# Patient Record
Sex: MALE | Race: WHITE
[De-identification: names, ages, dates, MRNs, and addresses within clinical notes are randomized per-mention and may not be internally consistent; named-entity substitution may affect disease eponyms.]

---

## 2021-04-11 ENCOUNTER — HOSPITAL ENCOUNTER (EMERGENCY)
Dept: HOSPITAL 7 - FB.ED | Age: 21
Discharge: HOME | End: 2021-04-11
Payer: MEDICAID

## 2021-04-11 DIAGNOSIS — Z20.822: ICD-10-CM

## 2021-04-11 DIAGNOSIS — B34.9: ICD-10-CM

## 2021-04-11 DIAGNOSIS — J06.9: Primary | ICD-10-CM

## 2021-04-11 DIAGNOSIS — Z72.0: ICD-10-CM

## 2021-04-11 PROCEDURE — U0002 COVID-19 LAB TEST NON-CDC: HCPCS

## 2021-04-11 PROCEDURE — 87635 SARS-COV-2 COVID-19 AMP PRB: CPT

## 2021-04-11 PROCEDURE — 99282 EMERGENCY DEPT VISIT SF MDM: CPT

## 2021-04-11 PROCEDURE — 99284 EMERGENCY DEPT VISIT MOD MDM: CPT

## 2021-04-11 NOTE — EDM.PDOC
ED HPI GENERAL MEDICAL PROBLEM





- General


Stated Complaint: POSSIBLE COVID


Time Seen by Provider: 04/11/21 13:00


Source of Information: Reports: Patient


History Limitations: Reports: No Limitations





- History of Present Illness


INITIAL COMMENTS - FREE TEXT/NARRATIVE: 





20-year-old male who reports on Thursday night, 4/8/2021, that he began to have 

nasal congestion, malaise, fatigue and body aches. He has since lost his sense 

of taste and smell. He also has developed some nausea with stomach cramping and 

decreased appetite. He also had diarrhea yesterday but that has pretty much 

resolved today. He has been drinking liquids well but has not really been 

eating. He has minimal cough and shortness of breath at present but he does feel

short of breath at times. The nasal congestion has waxed and waned over the past

3 days. He also states that he feels generally weak and tired. He reports that 

he has a headache and body aches and these are aching and throbbing type pains 

that he rates as a 5/10. There are no other associated signs or symptoms. There 

are no other modifying factors.


Onset: Other ([id number])


Duration: Constant


Location: Reports: Head, Neck, Generalized


Quality: Reports: Ache, Throbbing


Severity: Moderate


Improves with: Reports: Rest


Worsens with: Reports: Movement


Context: Reports: Other


Associated Symptoms: Reports: Cough, Headaches, Loss of Appetite, Nause

a/Vomiting (And diarrhea), Weakness


Treatments PTA: Reports: Other (see below) (Nothing.)





- Related Data


                                    Allergies











Allergy/AdvReac Type Severity Reaction Status Date / Time


 


No Known Allergies Allergy   Verified 04/11/21 13:08











Home Meds: 


                                    Home Meds





NK [No Known Home Meds]  04/11/21 [History]











Past Medical History





- Past Health History


Medical/Surgical History: Denies Medical/Surgical History





- Past Surgical History


Other Surgical History Comment: No previous surgeries.





Social & Family History





- Tobacco Use


Tobacco Use Status *Q: Current Every Day Tobacco User





- Alcohol Use


Alcohol Use History: No





- Living Situation & Occupation


Occupation: Employed (Works at [employer].)





ED ROS GENERAL





- Review of Systems


Review Of Systems: See Below


Constitutional: Reports: Malaise, Weakness, Fatigue, Decreased Appetite


HEENT: Reports: Other (Nasal congestion. Loss of taste and smell.)


Respiratory: Reports: Shortness of Breath (Intermittently), Cough (Mild and 

nonproductive)


Cardiovascular: Reports: No Symptoms


Endocrine: Reports: No Symptoms


GI/Abdominal: Reports: Diarrhea, Nausea, Other (Some stomach cramping)


: Reports: No Symptoms


Musculoskeletal: Reports: Other (Generalized body aches.)


Skin: Reports: No Symptoms


Neurological: Reports: Headache


Psychiatric: Reports: No Symptoms


Hematologic/Lymphatic: Reports: No Symptoms


Immunologic: Reports: No Symptoms





ED EXAM, GENERAL





- Physical Exam


Exam: See Below


Exam Limited By: No Limitations


General Appearance: Alert, WD/WN, No Apparent Distress, Other (Nontoxic. Pulse 

rate on my exam was in the 90s.)


Eye Exam: Bilateral Eye: EOMI, Normal Inspection, PERRL


Ears: Normal External Exam, Hearing Grossly Normal


Ear Exam: Bilateral Ear: Auricle Normal


Nose: No Blood, Clear Rhinorrhea, Other (Medical still edema.)


Throat/Mouth: Normal Inspection, Normal Lips, Normal Oropharynx, Normal Voice, 

No Airway Compromise


Head: Atraumatic, Normocephalic


Neck: Normal Inspection, Supple, Non-Tender, Full Range of Motion


Respiratory/Chest: No Respiratory Distress, Lungs Clear, Normal Breath Sounds, 

No Accessory Muscle Use, Chest Non-Tender


Cardiovascular: Normal Peripheral Pulses, Regular Rate, Rhythm, No Murmur


Peripheral Pulses: 2+: Radial (L), Radial (R)


GI/Abdominal: Normal Bowel Sounds, Soft, Non-Tender, No Mass


Back Exam: Normal Inspection, Full Range of Motion


Extremities: Normal Inspection, Normal Range of Motion, Non-Tender, No Pedal 

Edema, Normal Capillary Refill


Neurological: Alert, Oriented, CN II-XII Intact, Normal Cognition, No 

Motor/Sensory Deficits


Skin Exam: Warm, Dry, Intact, Normal Color, No Rash





Course





- Vital Signs


Last Recorded V/S: 


                                Last Vital Signs











Temp  36.6 C   04/11/21 13:44


 


Pulse  109 H  04/11/21 13:44


 


Resp  14   04/11/21 13:44


 


BP  133/67   04/11/21 13:44


 


Pulse Ox  99   04/11/21 13:44














- Orders/Labs/Meds


Labs: 


                                Laboratory Tests











  04/11/21 Range/Units





  13:00 


 


SARS-CoV-2 RNA ([facility])  Negative  (NEGATIVE)  














- Re-Assessments/Exams


Free Text/Narrative Re-Assessment/Exam: 





04/11/21 13:50: Patient's exam and vital signs are reassuring. He does not 

appear dehydrated at this point. His rapid Covid test has come back negative. He

 does appear to have some type of viral illness and I will give the patient 2 

days off work. He is stable for discharge and treatment should be symptomatic. I

 will give the patient a take home pack of Zofran 4 mg ODT that he can use for 

stomach cramps or nausea. He needs to rest. He needs to increase his fluid 

intake. He should take Tylenol and ibuprofen as needed for pain. He can also 

take Imodium (over-the-counter) for his diarrhea as needed.








Departure





- Departure


Time of Disposition: 13:57


Disposition: Home, Self-Care 01


Condition: Good


Clinical Impression: 


 Stable., Viral syndrome





URI (upper respiratory infection)


Qualifiers:


 URI type: unspecified viral URI Qualified Code(s): J06.9 - Acute upper 

respiratory infection, unspecified








- Discharge Information


Instructions:  Viral Respiratory Infection, Easy-To-Read


Referrals: 


Audie Tyler MD [Primary Care Provider] - 


Forms:  ED Return to Work/School Form


Additional Instructions: 


Your rapid Covid test was negative. You do appear to have a viral illness. You 

should rest. No work for the next 2 days. You can take Tylenol 1000 mg by mouth 

every 6 hours as needed for pain or fever. You can also take ibuprofen 600 mg by

mouth every 6 hours as needed for pain or fever. Use the Zofran that I gave you 

as needed for, vomiting or stomach cramping. You can get Imodium 

(over-the-counter) to take as needed for your diarrhea. You definitely need to 

increase your fluid intake. Back to the emergency department for trouble 

breathing, unrelenting vomiting or any other concerning signs or symptoms.





Sepsis Event Note (ED)





- Focused Exam


Vital Signs: 


                                   Vital Signs











  Temp Pulse Resp BP Pulse Ox


 


 04/11/21 13:44  36.6 C  109 H  14  133/67  99

## 2021-08-14 ENCOUNTER — HOSPITAL ENCOUNTER (EMERGENCY)
Dept: HOSPITAL 7 - FB.ED | Age: 21
Discharge: HOME | End: 2021-08-14
Payer: MEDICAID

## 2021-08-14 DIAGNOSIS — Z20.822: ICD-10-CM

## 2021-08-14 DIAGNOSIS — B34.9: Primary | ICD-10-CM

## 2021-08-14 PROCEDURE — U0002 COVID-19 LAB TEST NON-CDC: HCPCS

## 2021-08-14 NOTE — EDM.PDOC
ED HPI GENERAL MEDICAL PROBLEM





- General


Stated Complaint: COVID EXPOSURE


Time Seen by Provider: 08/14/21 13:00


Source of Information: Reports: Patient


History Limitations: Reports: No Limitations





- History of Present Illness


INITIAL COMMENTS - FREE TEXT/NARRATIVE: 


20-year-old gentleman came to the emergency department for evaluation of upper 

respiratory symptoms and possible exposure to COVID-19.  He states that he has 

been sick for 2 to 3 days.  He missed work last night.  His complaints include 

sinus congestion, headache, sore throat, cough, some bloody sputum, fatigue, 

lethargy, fever/chills.  Patient states that he has not taken anything to try to

alleviate the symptoms.  Patient states that there has been some Covid positive 

contacts at work and that he has been tested at work in the past and has been 

negative.  He is not vaccinated.  He has no other concerns or complaints at this

time including change in bowel or bladder habits, chest pain.








- Related Data


                                    Allergies











Allergy/AdvReac Type Severity Reaction Status Date / Time


 


No Known Allergies Allergy   Verified 04/11/21 13:08











Home Meds: 


                                    Home Meds





NK [No Known Home Meds]  04/11/21 [History]











Past Medical History





- Past Health History


Medical/Surgical History: Denies Medical/Surgical History





- Past Surgical History


Other Surgical History Comment: No previous surgeries.





Social & Family History





- Family History


Family Medical History: No Pertinent Family History





- Caffeine Use


Caffeine Use: Reports: None





- Living Situation & Occupation


Occupation: Employed (Works at [employer])





ED ROS GENERAL





- Review of Systems


Review Of Systems: See Below


Constitutional: Reports: Fatigue


HEENT: Reports: Nosebleed, Throat Pain


Respiratory: Reports: Cough, Sputum


Cardiovascular: Reports: No Symptoms


Endocrine: Reports: Fatigue


GI/Abdominal: Reports: No Symptoms


: Reports: No Symptoms


Musculoskeletal: Reports: Joint Pain, Muscle Pain


Skin: Reports: No Symptoms


Neurological: Reports: No Symptoms


Psychiatric: Reports: No Symptoms


Hematologic/Lymphatic: Reports: No Symptoms


Immunologic: Reports: No Symptoms





ED EXAM, GENERAL





- Physical Exam


Exam: See Below


Exam Limited By: No Limitations


General Appearance: Alert, WD/WN, No Apparent Distress


Eye Exam: Bilateral Eye: EOMI


Ears: Normal External Exam, Normal Canal, Normal TMs


Nose: Other (Mild erythema and edema bilaterally with no exudate)


Throat/Mouth: Other (Mild erythema, no obvious edema, no exudate)


Head: Atraumatic, Normocephalic


Neck: Normal Inspection, Supple.  No: Lymphadenopathy (R), Lymphadenopathy (L)


Respiratory/Chest: No Respiratory Distress, Lungs Clear, Normal Breath Sounds


Cardiovascular: Normal Peripheral Pulses, Regular Rate, Rhythm, No Edema


Peripheral Pulses: 2+: Radial (L), Radial (R), Dorsalis Pedis (L), Dorsalis 

Pedis (R)


GI/Abdominal: Normal Bowel Sounds, Soft, Non-Tender


Back Exam: Normal Inspection


Extremities: Normal Inspection


Neurological: Alert, Oriented, CN II-XII Intact, Normal Cognition, Normal Gait


Psychiatric: Normal Affect, Normal Mood


Skin Exam: Warm, Dry, Intact





Course





- Orders/Labs/Meds


Orders: 


                               Active Orders 24 hr











 Category Date Time Status


 


 CORONAVIRUS COVID-19 [facility] [[facility]] Stat Lab  08/14/21 13:20 Received














Departure





- Departure


Time of Disposition: 13:51


Disposition: Home, Self-Care 01


Condition: Good


Clinical Impression: 


 Viral syndrome, Exposure to COVID-19 virus








- Discharge Information


*PRESCRIPTION DRUG MONITORING PROGRAM REVIEWED*: Not Applicable


*COPY OF PRESCRIPTION DRUG MONITORING REPORT IN PATIENT NONI: Not Applicable


Referrals: 


Audie Tyler MD [Primary Care Provider] - 


Forms:  ED Return to Work/School Form





- My Orders


Last 24 Hours: 


My Active Orders





08/14/21 13:20


CORONAVIRUS COVID-19 [facility] [[facility]] Stat 














- Assessment/Plan


Last 24 Hours: 


My Active Orders





08/14/21 13:20


CORONAVIRUS COVID-19 [facility] [[facility]] Stat

## 2021-09-14 ENCOUNTER — HOSPITAL ENCOUNTER (EMERGENCY)
Dept: HOSPITAL 52 - LL.ED | Age: 21
Discharge: HOME | End: 2021-09-14
Payer: COMMERCIAL
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
## 2021-09-14 DIAGNOSIS — Z72.0: ICD-10-CM

## 2021-09-14 DIAGNOSIS — S60.221A: Primary | ICD-10-CM

## 2021-09-14 NOTE — EDM.PDOC
ED HPI GENERAL MEDICAL PROBLEM





- General


Chief Complaint: Upper Extremity Injury/Pain


Stated Complaint: Possible broken hand


Time Seen by Provider: 09/14/21 02:30


Source of Information: Reports: Patient


History Limitations: Reports: No Limitations





- History of Present Illness


INITIAL COMMENTS - FREE TEXT/NARRATIVE: 





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  

Has pain, swelling.  Some numbness where hammer hit the hand. No laceration. No 

other injuries reported. 


Treatments PTA: Reports: Cold Therapy





- Related Data


                                    Allergies











Allergy/AdvReac Type Severity Reaction Status Date / Time


 


No Known Allergies Allergy   Verified 09/14/21 02:12











Home Meds: 


                                    Home Meds





. [No Known Home Meds]  09/14/21 [History]











Past Medical History





- Past Health History


Medical/Surgical History: Denies Medical/Surgical History





Social & Family History





- Tobacco Use


Tobacco Use Status *Q: Current Every Day Tobacco User


Years of Tobacco use: 10


Packs/Tins Daily: 0.5





- Caffeine Use


Caffeine Use: Reports: Soda





- Recreational Drug Use


Recreational Drug Use: Yes


Drug Use in Last 12 Months: No


Recreational Drug Type: Reports: Marijuana/Hashish


Recreational Drug Use Frequency: Weekly





Review of Systems





- Review of Systems


Review Of Systems: See Below


Musculoskeletal: Reports: Hand Pain


Skin: Reports: No Symptoms


Neurological: Reports: Numbness





ED EXAM, GENERAL





- Physical Exam


Exam: See Below


Exam Limited By: No Limitations


General Appearance: Alert, WD/WN, No Apparent Distress


Eye Exam: Bilateral Eye: EOMI, PERRL


Ears: Hearing Grossly Normal


Throat/Mouth: Normal Voice, No Airway Compromise


Head: Atraumatic, Normocephalic


Neck: Supple


Respiratory/Chest: No Respiratory Distress


Cardiovascular: Normal Peripheral Pulses


Extremities: Normal Capillary Refill, Other (No bruising/redness noted. Skin 

intact.  Tender with mild swelling mid/distal right 4th metacarpal.  Patient 

reports skin in this area is also a bit numb.  Unable to make fist due to 

discomfort in that area. Wrist and fingers non-tender. )


Neurological: Alert, Oriented, Normal Cognition, Normal Gait


Psychiatric: Normal Affect, Normal Mood


Skin Exam: Warm, Dry, Intact, Normal Color





Course





- Vital Signs


Last Recorded V/S: 


                                Last Vital Signs











Temp  37.9 C   09/14/21 02:35


 


Pulse  85   09/14/21 02:35


 


Resp  14   09/14/21 02:35


 


BP  119/65   09/14/21 02:35


 


Pulse Ox  99   09/14/21 02:35














- Orders/Labs/Meds


Orders: 


                               Active Orders 24 hr











 Category Date Time Status


 


 Hand Comp Min 3V Rt [CR] Stat Exams  09/14/21 02:13 Ordered











Meds: 


Medications














Discontinued Medications














Generic Name Dose Route Start Last Admin





  Trade Name Gabriel  PRN Reason Stop Dose Admin


 


Tramadol HCl  50 mg  09/14/21 03:12 





  Tramadol 50 Mg Tab  PO  09/14/21 03:13 





  ONETIME ONE  














- Re-Assessments/Exams


Free Text/Narrative Re-Assessment/Exam: 





09/14/21 03:16


Xrays taken/no obvious fracture.  4th metacarpal however appears slightly bowed 

compared to rest of metacarpals.  Plan at this time is to apply pre-huong hand 

splint for protection and have patient follow up at Ortho walk in [facility]  

tomorrow for recheck.  Placed off work until 17th. Single Tramadol ordered.  

Ice/rest/splinting recommended along with PRN Aleve/motrin and/or Tylenol. 





Departure





- Departure


Time of Disposition: 03:07


Disposition: Home, Self-Care 01


Condition: Good


Clinical Impression: 


Contusion of hand, right


Qualifiers:


 Encounter type: initial encounter Qualified Code(s): S60.221A - Contusion of 

right hand, initial encounter








- Discharge Information


*PRESCRIPTION DRUG MONITORING PROGRAM REVIEWED*: Not Applicable


*COPY OF PRESCRIPTION DRUG MONITORING REPORT IN PATIENT AMANDA: Not Applicable


Instructions:  Hand Contusion, Easy-to-Read


Referrals: 


PCP,None [Primary Care Provider] - 


Forms:  ED Department Discharge


Additional Instructions: 


No work for three days. Follow up Ortho walk-in clinic at [place] tomorrow.  

Further restrictions as needed per Ortho.  Ice/splint and Tylenol/Aleve as 

needed for pain. 





Sepsis Event Note (ED)





- Evaluation


Sepsis Screening Result: No Definite Risk





- Focused Exam


Vital Signs: 


                                   Vital Signs











  Temp Pulse Resp BP Pulse Ox


 


 09/14/21 02:35  37.9 C  85  14  119/65  99














- My Orders


Last 24 Hours: 


My Active Orders





09/14/21 02:13


Hand Comp Min 3V Rt [CR] Stat 














- Assessment/Plan


Last 24 Hours: 


My Active Orders





09/14/21 02:13


Hand Comp Min 3V Rt [CR] Stat